# Patient Record
Sex: MALE | Race: WHITE | NOT HISPANIC OR LATINO | Employment: OTHER | URBAN - METROPOLITAN AREA
[De-identification: names, ages, dates, MRNs, and addresses within clinical notes are randomized per-mention and may not be internally consistent; named-entity substitution may affect disease eponyms.]

---

## 2020-06-03 ENCOUNTER — HOSPITAL ENCOUNTER (OUTPATIENT)
Facility: MEDICAL CENTER | Age: 75
DRG: 281 | End: 2020-06-03
Payer: MEDICARE

## 2020-06-03 ENCOUNTER — HOSPITAL ENCOUNTER (INPATIENT)
Facility: MEDICAL CENTER | Age: 75
LOS: 2 days | DRG: 281 | End: 2020-06-05
Attending: INTERNAL MEDICINE | Admitting: INTERNAL MEDICINE
Payer: MEDICARE

## 2020-06-03 PROBLEM — I21.4 NSTEMI (NON-ST ELEVATED MYOCARDIAL INFARCTION) (HCC): Status: ACTIVE | Noted: 2020-06-03

## 2020-06-03 PROBLEM — N40.0 BPH (BENIGN PROSTATIC HYPERPLASIA): Status: ACTIVE | Noted: 2020-06-03

## 2020-06-03 PROBLEM — D72.829 LEUKOCYTOSIS: Status: ACTIVE | Noted: 2020-06-03

## 2020-06-03 PROBLEM — I10 HTN (HYPERTENSION): Status: ACTIVE | Noted: 2020-06-03

## 2020-06-03 LAB
ALBUMIN SERPL BCP-MCNC: 3.6 G/DL (ref 3.2–4.9)
ALBUMIN/GLOB SERPL: 1.4 G/DL
ALP SERPL-CCNC: 52 U/L (ref 30–99)
ALT SERPL-CCNC: 19 U/L (ref 2–50)
ANION GAP SERPL CALC-SCNC: 12 MMOL/L (ref 7–16)
AST SERPL-CCNC: 38 U/L (ref 12–45)
BASOPHILS # BLD AUTO: 0.2 % (ref 0–1.8)
BASOPHILS # BLD: 0.03 K/UL (ref 0–0.12)
BILIRUB SERPL-MCNC: 0.6 MG/DL (ref 0.1–1.5)
BUN SERPL-MCNC: 28 MG/DL (ref 8–22)
CALCIUM SERPL-MCNC: 9.3 MG/DL (ref 8.5–10.5)
CHLORIDE SERPL-SCNC: 102 MMOL/L (ref 96–112)
CO2 SERPL-SCNC: 21 MMOL/L (ref 20–33)
CREAT SERPL-MCNC: 0.86 MG/DL (ref 0.5–1.4)
EKG IMPRESSION: NORMAL
EOSINOPHIL # BLD AUTO: 0 K/UL (ref 0–0.51)
EOSINOPHIL NFR BLD: 0 % (ref 0–6.9)
ERYTHROCYTE [DISTWIDTH] IN BLOOD BY AUTOMATED COUNT: 42.7 FL (ref 35.9–50)
GLOBULIN SER CALC-MCNC: 2.5 G/DL (ref 1.9–3.5)
GLUCOSE SERPL-MCNC: 116 MG/DL (ref 65–99)
HCT VFR BLD AUTO: 41.7 % (ref 42–52)
HGB BLD-MCNC: 14.6 G/DL (ref 14–18)
IMM GRANULOCYTES # BLD AUTO: 0.07 K/UL (ref 0–0.11)
IMM GRANULOCYTES NFR BLD AUTO: 0.5 % (ref 0–0.9)
LYMPHOCYTES # BLD AUTO: 0.84 K/UL (ref 1–4.8)
LYMPHOCYTES NFR BLD: 5.4 % (ref 22–41)
MCH RBC QN AUTO: 31.1 PG (ref 27–33)
MCHC RBC AUTO-ENTMCNC: 35 G/DL (ref 33.7–35.3)
MCV RBC AUTO: 88.9 FL (ref 81.4–97.8)
MONOCYTES # BLD AUTO: 0.99 K/UL (ref 0–0.85)
MONOCYTES NFR BLD AUTO: 6.4 % (ref 0–13.4)
NEUTROPHILS # BLD AUTO: 13.61 K/UL (ref 1.82–7.42)
NEUTROPHILS NFR BLD: 87.5 % (ref 44–72)
NRBC # BLD AUTO: 0 K/UL
NRBC BLD-RTO: 0 /100 WBC
PLATELET # BLD AUTO: 209 K/UL (ref 164–446)
PMV BLD AUTO: 10 FL (ref 9–12.9)
POTASSIUM SERPL-SCNC: 3.8 MMOL/L (ref 3.6–5.5)
PROT SERPL-MCNC: 6.1 G/DL (ref 6–8.2)
RBC # BLD AUTO: 4.69 M/UL (ref 4.7–6.1)
SODIUM SERPL-SCNC: 135 MMOL/L (ref 135–145)
TROPONIN T SERPL-MCNC: 310 NG/L (ref 6–19)
WBC # BLD AUTO: 15.5 K/UL (ref 4.8–10.8)

## 2020-06-03 PROCEDURE — 80053 COMPREHEN METABOLIC PANEL: CPT

## 2020-06-03 PROCEDURE — 93005 ELECTROCARDIOGRAM TRACING: CPT | Performed by: INTERNAL MEDICINE

## 2020-06-03 PROCEDURE — 85025 COMPLETE CBC W/AUTO DIFF WBC: CPT

## 2020-06-03 PROCEDURE — A9270 NON-COVERED ITEM OR SERVICE: HCPCS | Performed by: HOSPITALIST

## 2020-06-03 PROCEDURE — 84484 ASSAY OF TROPONIN QUANT: CPT

## 2020-06-03 PROCEDURE — 700111 HCHG RX REV CODE 636 W/ 250 OVERRIDE (IP): Performed by: HOSPITALIST

## 2020-06-03 PROCEDURE — 99223 1ST HOSP IP/OBS HIGH 75: CPT | Mod: AI | Performed by: HOSPITALIST

## 2020-06-03 PROCEDURE — 93010 ELECTROCARDIOGRAM REPORT: CPT | Performed by: INTERNAL MEDICINE

## 2020-06-03 PROCEDURE — 93005 ELECTROCARDIOGRAM TRACING: CPT | Performed by: HOSPITALIST

## 2020-06-03 PROCEDURE — 36415 COLL VENOUS BLD VENIPUNCTURE: CPT

## 2020-06-03 PROCEDURE — 700102 HCHG RX REV CODE 250 W/ 637 OVERRIDE(OP): Performed by: HOSPITALIST

## 2020-06-03 PROCEDURE — 770020 HCHG ROOM/CARE - TELE (206)

## 2020-06-03 RX ORDER — HEPARIN SODIUM 5000 [USP'U]/100ML
INJECTION, SOLUTION INTRAVENOUS CONTINUOUS
Status: DISCONTINUED | OUTPATIENT
Start: 2020-06-03 | End: 2020-06-04

## 2020-06-03 RX ORDER — ONDANSETRON 4 MG/1
4 TABLET, ORALLY DISINTEGRATING ORAL EVERY 4 HOURS PRN
Status: DISCONTINUED | OUTPATIENT
Start: 2020-06-03 | End: 2020-06-03

## 2020-06-03 RX ORDER — HEPARIN SODIUM 1000 [USP'U]/ML
3200 INJECTION, SOLUTION INTRAVENOUS; SUBCUTANEOUS PRN
Status: DISCONTINUED | OUTPATIENT
Start: 2020-06-03 | End: 2020-06-04

## 2020-06-03 RX ORDER — ASPIRIN 300 MG/1
300 SUPPOSITORY RECTAL DAILY
Status: DISCONTINUED | OUTPATIENT
Start: 2020-06-04 | End: 2020-06-04

## 2020-06-03 RX ORDER — ONDANSETRON 2 MG/ML
4 INJECTION INTRAMUSCULAR; INTRAVENOUS EVERY 4 HOURS PRN
Status: DISCONTINUED | OUTPATIENT
Start: 2020-06-03 | End: 2020-06-03

## 2020-06-03 RX ORDER — GUAIFENESIN 600 MG/1
600 TABLET, EXTENDED RELEASE ORAL 2 TIMES DAILY
COMMUNITY

## 2020-06-03 RX ORDER — POLYETHYLENE GLYCOL 3350 17 G/17G
1 POWDER, FOR SOLUTION ORAL
Status: DISCONTINUED | OUTPATIENT
Start: 2020-06-03 | End: 2020-06-05 | Stop reason: HOSPADM

## 2020-06-03 RX ORDER — ASPIRIN 81 MG/1
324 TABLET, CHEWABLE ORAL DAILY
Status: DISCONTINUED | OUTPATIENT
Start: 2020-06-04 | End: 2020-06-04

## 2020-06-03 RX ORDER — BISACODYL 10 MG
10 SUPPOSITORY, RECTAL RECTAL
Status: DISCONTINUED | OUTPATIENT
Start: 2020-06-03 | End: 2020-06-05 | Stop reason: HOSPADM

## 2020-06-03 RX ORDER — HEPARIN SODIUM 1000 [USP'U]/ML
6000 INJECTION, SOLUTION INTRAVENOUS; SUBCUTANEOUS ONCE
Status: COMPLETED | OUTPATIENT
Start: 2020-06-03 | End: 2020-06-03

## 2020-06-03 RX ORDER — DIPHENHYDRAMINE HCL 25 MG
25 TABLET ORAL ONCE
Status: COMPLETED | OUTPATIENT
Start: 2020-06-03 | End: 2020-06-03

## 2020-06-03 RX ORDER — MONTELUKAST SODIUM 10 MG/1
10 TABLET ORAL DAILY
COMMUNITY

## 2020-06-03 RX ORDER — ACETAMINOPHEN 500 MG
1000 TABLET ORAL EVERY 6 HOURS PRN
COMMUNITY

## 2020-06-03 RX ORDER — AMOXICILLIN 250 MG
2 CAPSULE ORAL 2 TIMES DAILY
Status: DISCONTINUED | OUTPATIENT
Start: 2020-06-03 | End: 2020-06-05 | Stop reason: HOSPADM

## 2020-06-03 RX ORDER — CARVEDILOL 6.25 MG/1
6.25 TABLET ORAL 2 TIMES DAILY WITH MEALS
COMMUNITY

## 2020-06-03 RX ORDER — ASPIRIN 325 MG
325 TABLET ORAL DAILY
Status: DISCONTINUED | OUTPATIENT
Start: 2020-06-04 | End: 2020-06-04

## 2020-06-03 RX ORDER — DIPHENHYDRAMINE HCL 50 MG
50 CAPSULE ORAL EVERY 4 HOURS PRN
COMMUNITY

## 2020-06-03 RX ORDER — ALPRAZOLAM 0.25 MG/1
0.25 TABLET ORAL ONCE
Status: COMPLETED | OUTPATIENT
Start: 2020-06-03 | End: 2020-06-04

## 2020-06-03 RX ORDER — ACETAMINOPHEN 325 MG/1
650 TABLET ORAL EVERY 6 HOURS PRN
Status: DISCONTINUED | OUTPATIENT
Start: 2020-06-03 | End: 2020-06-05 | Stop reason: HOSPADM

## 2020-06-03 RX ORDER — TAMSULOSIN HYDROCHLORIDE 0.4 MG/1
0.4 CAPSULE ORAL
COMMUNITY

## 2020-06-03 RX ADMIN — HEPARIN SODIUM 6000 UNITS: 1000 INJECTION, SOLUTION INTRAVENOUS; SUBCUTANEOUS at 21:34

## 2020-06-03 RX ADMIN — DIPHENHYDRAMINE HYDROCHLORIDE 25 MG: 25 TABLET ORAL at 21:28

## 2020-06-03 RX ADMIN — HEPARIN SODIUM 1200 UNITS/HR: 5000 INJECTION, SOLUTION INTRAVENOUS at 21:34

## 2020-06-03 ASSESSMENT — ENCOUNTER SYMPTOMS
HEADACHES: 0
WEAKNESS: 0
DIZZINESS: 0
SORE THROAT: 0
NAUSEA: 0
SHORTNESS OF BREATH: 0
COUGH: 0
DOUBLE VISION: 0
DEPRESSION: 0
BLURRED VISION: 0
INSOMNIA: 0
PALPITATIONS: 0
VOMITING: 0
FEVER: 0
MYALGIAS: 0
NECK PAIN: 0
BRUISES/BLEEDS EASILY: 0

## 2020-06-03 ASSESSMENT — COGNITIVE AND FUNCTIONAL STATUS - GENERAL
SUGGESTED CMS G CODE MODIFIER DAILY ACTIVITY: CH
DAILY ACTIVITIY SCORE: 24
MOBILITY SCORE: 24
SUGGESTED CMS G CODE MODIFIER MOBILITY: CH

## 2020-06-03 ASSESSMENT — PATIENT HEALTH QUESTIONNAIRE - PHQ9
SUM OF ALL RESPONSES TO PHQ9 QUESTIONS 1 AND 2: 0
2. FEELING DOWN, DEPRESSED, IRRITABLE, OR HOPELESS: NOT AT ALL
1. LITTLE INTEREST OR PLEASURE IN DOING THINGS: NOT AT ALL

## 2020-06-03 ASSESSMENT — LIFESTYLE VARIABLES
ALCOHOL_USE: NO
HAVE YOU EVER FELT YOU SHOULD CUT DOWN ON YOUR DRINKING: NO
AVERAGE NUMBER OF DAYS PER WEEK YOU HAVE A DRINK CONTAINING ALCOHOL: 0
EVER HAD A DRINK FIRST THING IN THE MORNING TO STEADY YOUR NERVES TO GET RID OF A HANGOVER: NO
TOTAL SCORE: 0
EVER_SMOKED: YES
HAVE PEOPLE ANNOYED YOU BY CRITICIZING YOUR DRINKING: NO
EVER FELT BAD OR GUILTY ABOUT YOUR DRINKING: NO
HOW MANY TIMES IN THE PAST YEAR HAVE YOU HAD 5 OR MORE DRINKS IN A DAY: 0
ON A TYPICAL DAY WHEN YOU DRINK ALCOHOL HOW MANY DRINKS DO YOU HAVE: 0
TOTAL SCORE: 0
TOTAL SCORE: 0
CONSUMPTION TOTAL: NEGATIVE
DOES PATIENT WANT TO STOP DRINKING: NO

## 2020-06-03 NOTE — PROGRESS NOTES
Triage officer note /transfer note       D/W Dr Marquez     CC: vomitting and weakness, no CP, NSTEMI. Hx of AICD and HTN. WBC 23k, but culture pending, no fever. Cr 1.5.     From [facility], trop 3.125->3.418. Dr. Ceja agreed to see patient here.      Need to do: call cards while patient arrived     Admit to Inpatient telemetry admission with cardiac monitoring.

## 2020-06-04 ENCOUNTER — APPOINTMENT (OUTPATIENT)
Dept: CARDIOLOGY | Facility: MEDICAL CENTER | Age: 75
DRG: 281 | End: 2020-06-04
Attending: INTERNAL MEDICINE
Payer: MEDICARE

## 2020-06-04 ENCOUNTER — APPOINTMENT (OUTPATIENT)
Dept: CARDIOLOGY | Facility: MEDICAL CENTER | Age: 75
DRG: 281 | End: 2020-06-04
Attending: HOSPITALIST
Payer: MEDICARE

## 2020-06-04 PROBLEM — N30.00 ACUTE CYSTITIS WITHOUT HEMATURIA: Status: ACTIVE | Noted: 2020-06-03

## 2020-06-04 LAB
ANION GAP SERPL CALC-SCNC: 7 MMOL/L (ref 7–16)
ANISOCYTOSIS BLD QL SMEAR: ABNORMAL
APPEARANCE UR: ABNORMAL
APTT PPP: 138 SEC (ref 24.7–36)
APTT PPP: 99.4 SEC (ref 24.7–36)
BACTERIA #/AREA URNS HPF: ABNORMAL /HPF
BASOPHILS # BLD AUTO: 0.9 % (ref 0–1.8)
BASOPHILS # BLD: 0.11 K/UL (ref 0–0.12)
BILIRUB UR QL STRIP.AUTO: NEGATIVE
BUN SERPL-MCNC: 29 MG/DL (ref 8–22)
BURR CELLS BLD QL SMEAR: NORMAL
CALCIUM SERPL-MCNC: 9.3 MG/DL (ref 8.5–10.5)
CHLORIDE SERPL-SCNC: 104 MMOL/L (ref 96–112)
CO2 SERPL-SCNC: 26 MMOL/L (ref 20–33)
COLOR UR: YELLOW
CREAT SERPL-MCNC: 0.96 MG/DL (ref 0.5–1.4)
EKG IMPRESSION: NORMAL
EOSINOPHIL # BLD AUTO: 0 K/UL (ref 0–0.51)
EOSINOPHIL NFR BLD: 0 % (ref 0–6.9)
EPI CELLS #/AREA URNS HPF: ABNORMAL /HPF
ERYTHROCYTE [DISTWIDTH] IN BLOOD BY AUTOMATED COUNT: 43.7 FL (ref 35.9–50)
GLUCOSE SERPL-MCNC: 90 MG/DL (ref 65–99)
GLUCOSE UR STRIP.AUTO-MCNC: NEGATIVE MG/DL
HCT VFR BLD AUTO: 42.6 % (ref 42–52)
HGB BLD-MCNC: 14.6 G/DL (ref 14–18)
KETONES UR STRIP.AUTO-MCNC: ABNORMAL MG/DL
LEUKOCYTE ESTERASE UR QL STRIP.AUTO: ABNORMAL
LV EJECT FRACT  99904: 50
LV EJECT FRACT MOD 4C 99902: 58.37
LYMPHOCYTES # BLD AUTO: 0.11 K/UL (ref 1–4.8)
LYMPHOCYTES NFR BLD: 0.9 % (ref 22–41)
MANUAL DIFF BLD: NORMAL
MCH RBC QN AUTO: 31.1 PG (ref 27–33)
MCHC RBC AUTO-ENTMCNC: 34.3 G/DL (ref 33.7–35.3)
MCV RBC AUTO: 90.8 FL (ref 81.4–97.8)
MICRO URNS: ABNORMAL
MICROCYTES BLD QL SMEAR: ABNORMAL
MONOCYTES # BLD AUTO: 0.21 K/UL (ref 0–0.85)
MONOCYTES NFR BLD AUTO: 1.7 % (ref 0–13.4)
MORPHOLOGY BLD-IMP: NORMAL
NEUTROPHILS # BLD AUTO: 11.87 K/UL (ref 1.82–7.42)
NEUTROPHILS NFR BLD: 88.7 % (ref 44–72)
NEUTS BAND NFR BLD MANUAL: 7.8 % (ref 0–10)
NITRITE UR QL STRIP.AUTO: POSITIVE
NRBC # BLD AUTO: 0 K/UL
NRBC BLD-RTO: 0 /100 WBC
PH UR STRIP.AUTO: 7 [PH] (ref 5–8)
PLATELET # BLD AUTO: 193 K/UL (ref 164–446)
PLATELET BLD QL SMEAR: NORMAL
PMV BLD AUTO: 10.3 FL (ref 9–12.9)
POIKILOCYTOSIS BLD QL SMEAR: NORMAL
POTASSIUM SERPL-SCNC: 4 MMOL/L (ref 3.6–5.5)
PROT UR QL STRIP: 30 MG/DL
RBC # BLD AUTO: 4.69 M/UL (ref 4.7–6.1)
RBC # URNS HPF: ABNORMAL /HPF
RBC BLD AUTO: PRESENT
RBC UR QL AUTO: ABNORMAL
RENAL EPI CELLS #/AREA URNS HPF: NEGATIVE /HPF
SODIUM SERPL-SCNC: 137 MMOL/L (ref 135–145)
SP GR UR STRIP.AUTO: 1.02
TRANS CELLS #/AREA URNS HPF: NEGATIVE /HPF
TROPONIN T SERPL-MCNC: 269 NG/L (ref 6–19)
UROBILINOGEN UR STRIP.AUTO-MCNC: 2 MG/DL
WBC # BLD AUTO: 12.3 K/UL (ref 4.8–10.8)
WBC #/AREA URNS HPF: ABNORMAL /HPF

## 2020-06-04 PROCEDURE — 81001 URINALYSIS AUTO W/SCOPE: CPT

## 2020-06-04 PROCEDURE — 85730 THROMBOPLASTIN TIME PARTIAL: CPT

## 2020-06-04 PROCEDURE — 700102 HCHG RX REV CODE 250 W/ 637 OVERRIDE(OP): Performed by: INTERNAL MEDICINE

## 2020-06-04 PROCEDURE — 80048 BASIC METABOLIC PNL TOTAL CA: CPT

## 2020-06-04 PROCEDURE — A9270 NON-COVERED ITEM OR SERVICE: HCPCS | Performed by: NURSE PRACTITIONER

## 2020-06-04 PROCEDURE — B2111ZZ FLUOROSCOPY OF MULTIPLE CORONARY ARTERIES USING LOW OSMOLAR CONTRAST: ICD-10-PCS | Performed by: INTERNAL MEDICINE

## 2020-06-04 PROCEDURE — 700102 HCHG RX REV CODE 250 W/ 637 OVERRIDE(OP): Performed by: NURSE PRACTITIONER

## 2020-06-04 PROCEDURE — 93306 TTE W/DOPPLER COMPLETE: CPT | Mod: 26 | Performed by: INTERNAL MEDICINE

## 2020-06-04 PROCEDURE — B2151ZZ FLUOROSCOPY OF LEFT HEART USING LOW OSMOLAR CONTRAST: ICD-10-PCS | Performed by: INTERNAL MEDICINE

## 2020-06-04 PROCEDURE — 700101 HCHG RX REV CODE 250

## 2020-06-04 PROCEDURE — 700105 HCHG RX REV CODE 258: Performed by: INTERNAL MEDICINE

## 2020-06-04 PROCEDURE — 85027 COMPLETE CBC AUTOMATED: CPT

## 2020-06-04 PROCEDURE — 84484 ASSAY OF TROPONIN QUANT: CPT

## 2020-06-04 PROCEDURE — 94760 N-INVAS EAR/PLS OXIMETRY 1: CPT

## 2020-06-04 PROCEDURE — 700102 HCHG RX REV CODE 250 W/ 637 OVERRIDE(OP): Performed by: HOSPITALIST

## 2020-06-04 PROCEDURE — 770020 HCHG ROOM/CARE - TELE (206)

## 2020-06-04 PROCEDURE — 87086 URINE CULTURE/COLONY COUNT: CPT

## 2020-06-04 PROCEDURE — 85007 BL SMEAR W/DIFF WBC COUNT: CPT

## 2020-06-04 PROCEDURE — 99233 SBSQ HOSP IP/OBS HIGH 50: CPT | Performed by: INTERNAL MEDICINE

## 2020-06-04 PROCEDURE — 93458 L HRT ARTERY/VENTRICLE ANGIO: CPT | Mod: 26 | Performed by: INTERNAL MEDICINE

## 2020-06-04 PROCEDURE — 700111 HCHG RX REV CODE 636 W/ 250 OVERRIDE (IP): Performed by: INTERNAL MEDICINE

## 2020-06-04 PROCEDURE — A9270 NON-COVERED ITEM OR SERVICE: HCPCS | Performed by: INTERNAL MEDICINE

## 2020-06-04 PROCEDURE — 99153 MOD SED SAME PHYS/QHP EA: CPT

## 2020-06-04 PROCEDURE — 4A023N7 MEASUREMENT OF CARDIAC SAMPLING AND PRESSURE, LEFT HEART, PERCUTANEOUS APPROACH: ICD-10-PCS | Performed by: INTERNAL MEDICINE

## 2020-06-04 PROCEDURE — 700111 HCHG RX REV CODE 636 W/ 250 OVERRIDE (IP)

## 2020-06-04 PROCEDURE — 99152 MOD SED SAME PHYS/QHP 5/>YRS: CPT | Performed by: INTERNAL MEDICINE

## 2020-06-04 PROCEDURE — A9270 NON-COVERED ITEM OR SERVICE: HCPCS | Performed by: HOSPITALIST

## 2020-06-04 PROCEDURE — 700117 HCHG RX CONTRAST REV CODE 255: Performed by: INTERNAL MEDICINE

## 2020-06-04 PROCEDURE — 36415 COLL VENOUS BLD VENIPUNCTURE: CPT

## 2020-06-04 PROCEDURE — 93306 TTE W/DOPPLER COMPLETE: CPT

## 2020-06-04 RX ORDER — LIDOCAINE HYDROCHLORIDE 20 MG/ML
INJECTION, SOLUTION INFILTRATION; PERINEURAL
Status: COMPLETED
Start: 2020-06-04 | End: 2020-06-04

## 2020-06-04 RX ORDER — HEPARIN SODIUM,PORCINE 1000/ML
VIAL (ML) INJECTION
Status: COMPLETED
Start: 2020-06-04 | End: 2020-06-04

## 2020-06-04 RX ORDER — DIPHENHYDRAMINE HCL 25 MG
50 TABLET ORAL EVERY 4 HOURS PRN
Status: DISCONTINUED | OUTPATIENT
Start: 2020-06-04 | End: 2020-06-05 | Stop reason: HOSPADM

## 2020-06-04 RX ORDER — MIDAZOLAM HYDROCHLORIDE 1 MG/ML
INJECTION INTRAMUSCULAR; INTRAVENOUS
Status: COMPLETED
Start: 2020-06-04 | End: 2020-06-04

## 2020-06-04 RX ORDER — VERAPAMIL HYDROCHLORIDE 2.5 MG/ML
INJECTION, SOLUTION INTRAVENOUS
Status: COMPLETED
Start: 2020-06-04 | End: 2020-06-04

## 2020-06-04 RX ORDER — HEPARIN SODIUM 200 [USP'U]/100ML
INJECTION, SOLUTION INTRAVENOUS
Status: COMPLETED
Start: 2020-06-04 | End: 2020-06-04

## 2020-06-04 RX ORDER — GUAIFENESIN 600 MG/1
600 TABLET, EXTENDED RELEASE ORAL 2 TIMES DAILY
Status: DISCONTINUED | OUTPATIENT
Start: 2020-06-04 | End: 2020-06-05 | Stop reason: HOSPADM

## 2020-06-04 RX ORDER — CARVEDILOL 6.25 MG/1
6.25 TABLET ORAL 2 TIMES DAILY WITH MEALS
Status: DISCONTINUED | OUTPATIENT
Start: 2020-06-04 | End: 2020-06-05 | Stop reason: HOSPADM

## 2020-06-04 RX ORDER — MONTELUKAST SODIUM 10 MG/1
10 TABLET ORAL DAILY
Status: DISCONTINUED | OUTPATIENT
Start: 2020-06-04 | End: 2020-06-05 | Stop reason: HOSPADM

## 2020-06-04 RX ORDER — TAMSULOSIN HYDROCHLORIDE 0.4 MG/1
0.4 CAPSULE ORAL
Status: DISCONTINUED | OUTPATIENT
Start: 2020-06-05 | End: 2020-06-05 | Stop reason: HOSPADM

## 2020-06-04 RX ADMIN — MIDAZOLAM HYDROCHLORIDE 1 MG: 1 INJECTION, SOLUTION INTRAMUSCULAR; INTRAVENOUS at 08:46

## 2020-06-04 RX ADMIN — FENTANYL CITRATE 25 MCG: 50 INJECTION, SOLUTION INTRAMUSCULAR; INTRAVENOUS at 08:46

## 2020-06-04 RX ADMIN — ALPRAZOLAM 0.25 MG: 0.25 TABLET ORAL at 05:47

## 2020-06-04 RX ADMIN — VERAPAMIL HYDROCHLORIDE 5 MG: 5 INJECTION INTRAVENOUS at 08:36

## 2020-06-04 RX ADMIN — HEPARIN SODIUM: 1000 INJECTION, SOLUTION INTRAVENOUS; SUBCUTANEOUS at 08:38

## 2020-06-04 RX ADMIN — ASPIRIN 325 MG: 325 TABLET, FILM COATED ORAL at 05:47

## 2020-06-04 RX ADMIN — IOHEXOL 80 ML: 350 INJECTION, SOLUTION INTRAVENOUS at 08:47

## 2020-06-04 RX ADMIN — NITROGLYCERIN 10 ML: 20 INJECTION INTRAVENOUS at 08:36

## 2020-06-04 RX ADMIN — CARVEDILOL 6.25 MG: 6.25 TABLET, FILM COATED ORAL at 17:37

## 2020-06-04 RX ADMIN — DIPHENHYDRAMINE HYDROCHLORIDE 50 MG: 25 TABLET ORAL at 20:31

## 2020-06-04 RX ADMIN — CEFTRIAXONE SODIUM 2 G: 2 INJECTION, POWDER, FOR SOLUTION INTRAMUSCULAR; INTRAVENOUS at 09:37

## 2020-06-04 RX ADMIN — LIDOCAINE HYDROCHLORIDE: 20 INJECTION, SOLUTION INFILTRATION; PERINEURAL at 08:35

## 2020-06-04 RX ADMIN — MONTELUKAST SODIUM 10 MG: 10 TABLET, COATED ORAL at 13:23

## 2020-06-04 RX ADMIN — GUAIFENESIN 600 MG: 600 TABLET, EXTENDED RELEASE ORAL at 17:37

## 2020-06-04 RX ADMIN — DIPHENHYDRAMINE HYDROCHLORIDE 50 MG: 25 TABLET ORAL at 13:23

## 2020-06-04 RX ADMIN — HEPARIN SODIUM 2000 UNITS: 200 INJECTION, SOLUTION INTRAVENOUS at 08:37

## 2020-06-04 RX ADMIN — DOCUSATE SODIUM 50 MG AND SENNOSIDES 8.6 MG 2 TABLET: 8.6; 5 TABLET, FILM COATED ORAL at 17:36

## 2020-06-04 ASSESSMENT — ENCOUNTER SYMPTOMS
HEARTBURN: 0
CHOKING: 0
CHILLS: 0
SORE THROAT: 0
FEVER: 0
COUGH: 0
FOCAL WEAKNESS: 0
WHEEZING: 0
CONSTIPATION: 0
DEPRESSION: 0
MYALGIAS: 0
BLURRED VISION: 0
SHORTNESS OF BREATH: 0
APNEA: 0
DIARRHEA: 0
WEAKNESS: 0
CHEST TIGHTNESS: 0
PALPITATIONS: 0
ABDOMINAL PAIN: 0
DIZZINESS: 0
BLOOD IN STOOL: 0
HEADACHES: 0
STRIDOR: 0
NAUSEA: 0
VOMITING: 0

## 2020-06-04 ASSESSMENT — FIBROSIS 4 INDEX: FIB4 SCORE: 3.34

## 2020-06-04 NOTE — ASSESSMENT & PLAN NOTE
-Likely reactive, improved from 23 prior transfer to 16.   -There is no clear indications of infection at this time.  -He did received IV fluids prior to transfer.  -Monitor morning labs.

## 2020-06-04 NOTE — RESPIRATORY CARE
Oxygen Rounds      Patient found on    O2 L/m:  2  Oxygen device:  Silicone nasal cannula  Spo2: _______96__%        Respiratory device skin site inspection completed.

## 2020-06-04 NOTE — CARE PLAN
Problem: Communication  Goal: The ability to communicate needs accurately and effectively will improve  Outcome: PROGRESSING AS EXPECTED  Note: Patient educated on medications, procedures and use of call light. Patient will continue to verbalize and improve on education and communication in the plan of care.        Problem: Safety  Goal: Will remain free from injury  Outcome: PROGRESSING AS EXPECTED  Note: Educated patient on use of call light, no slip socks on, bed lowest position. All needs attended to. Patient verbalized understanding.

## 2020-06-04 NOTE — PROGRESS NOTES
Patient back from cath lab, AOx4, VSS, post op VS initiated, R radial site clean/dry/soft with hemoband in place. Tele monitor on and monitor room notified. Heparin gtt DCd in cath lab.

## 2020-06-04 NOTE — PROGRESS NOTES
aptt >240 @0000  heparin gtt stopped for 1 hour @0030 per MD Chavez  (aptt retaken just in case while heparin gtt stopped and resulted as 99.4 @0101)  heparin gtt was restarted @0200  per pharmacist continue protocol on maintemace phase including rate change now.

## 2020-06-04 NOTE — ASSESSMENT & PLAN NOTE
-Direct Admission from [facility] [place].   -Troponin prior to transfer: 3.125 and then 3.418.  -Patient was seen and evaluated by cardiologist here.  -Continue heparin  -Patient will be taken to the Cath Lab in the morning and echocardiogram that was ordered  -Cardiologist requested prior medical records  -Appreciate cardiology consult and recommendations: I personally discussed this case with .  -Added laboratories including serial cardiac enzymes overnight.  -He is chest pain-free at this time.  -Patient does have a pacemaker will be interrogated.

## 2020-06-04 NOTE — PROGRESS NOTES
2 RN Skin Check    2 RN skin check completed with Kenia NEELY.   Devices in place: Nasal Cannula.  Skin assessed under devices: yes.  Confirmed pressure ulcers found on: NA.  New potential pressure ulcers noted on NA. Wound consult placed No.  The following interventions in place Pillows and Lotion.  Skin is CDI

## 2020-06-04 NOTE — PROGRESS NOTES
Transfer Center:    Received ED to Direct Admit transfer request from [facility] @ 1612pm   Sending Physician:  Jimmy   Specialist consulted:  fawad   Diagnosis:  NSTEMI   Patient accepted by:  Kip     Patient coming via:  air  ETA:  TBD   Medical records from sending facility scanned into Media tab.  Nursing to notify Direct Admit On-Call hospitalist and Specialist when patient arrives.     Plan:  Transfer Center to assist if needed.

## 2020-06-04 NOTE — PROCEDURES
DATE OF SERVICE:  06/04/2020    REFERRING PHYSICIANS:  Deyvi Mendoza MD, and Farheen Washington MD    PROCEDURES:  1.  Left heart catheterization.  2.  Coronary angiography.  3.  Left ventriculogram.  4.  Monitor conscious sedation.    PREPROCEDURE DIAGNOSES:  1.  Non-ST elevation myocardial infarction.  2.  Dilated cardiomyopathy with ejection fraction of 25%.    POSTPROCEDURE DIAGNOSES:  1.  Nonobstructive coronary artery disease.  2.  Reduced left ventricular systolic function with ejection fraction of 30%.  3.  Elevated left ventricular end-diastolic pressure.    INDICATION:  The patient is a 75-year-old male with dilated cardiomyopathy   with ejection fraction of 25%, CRT/ICD in place.  Laboratory examination   showed elevated troponin level.  He was scheduled for cardiac catheterization.    PROCEDURE:  After informed consent was signed by the patient, patient was   brought to the cardiac catheterization laboratory.  He was prepped and draped   in the usual sterile manner.  The right wrist area was anesthetized with 2%   Xylocaine.  A 6-Algerian sheath was inserted into the right radial artery using   modified Seldinger technique.  Intra-arterial verapamil and nitroglycerin were  given.  IV heparin was given.  A 4-Algerian JL-3.5 catheter was positioned into  the left main coronary artery.  Coronary angiography was performed.  This was  exchanged for a 4-Algerian JR-4 catheter, which was positioned into the right   coronary artery.  Coronary angiography was performed.  This catheter was   used to cross the aortic valve with a straight wire and exchanged over an   exchangedlength wire for a 4-Algerian pigtail catheter, which was positioned   into the left ventricle.  Left angiography was performed.  Patient tolerated the   procedure well.  At the end of procedure, all catheters and sheaths were   removed.  Hemoband was placed in the right wrist.  He was transferred back   to telemetry in stable  condition.    HEMODYNAMIC DATA:  Hemodynamic data shows aortic pressures of 120/80 mmHg  with mean of 100 mmHg and the /0 with LVEDP of 20 mmHg.    AORTIC VALVE:  There was 20 mm peak-to-peak gradient noted.    LEFT VENTRICULOGRAM:  A 10 mL of contrast was delivered for 3 seconds.    Ejection fraction was estimated to be 30%.  There was global hypokinesis   noted.    ANGIOGRAM:  1.  Left main coronary artery:  Left main coronary artery is a long   large-caliber vessel free of disease.  2.  Left anterior descending artery:  Left anterior descending artery is a   long moderate-caliber, which wraps around the apex.  Proximal portion of the   vessel, there are concentric 30-40% stenoses.  There are moderate-caliber   diagonal branches with proximal luminal irregularities of 20-30%.  3.  Ramus intermedius:  Ramus intermedius is a long large bifurcating vessel   with proximal concentric 20-30% stenosis.  4.  Left circumflex artery:  Left circumflex artery is a nondominant   moderate-caliber vessel with small-caliber first obtuse marginal branch and   moderate-caliber bifurcating second obtuse marginal branch.  Left circumflex   artery and its branches are free of disease.  5.  Right coronary artery:  Right coronary artery is a dominant large-caliber   vessel with diffuse stenosis throughout.  Proximal portion of the vessel,   there is a concentric 30-40% stenosis.  Mid portion of the vessel, there are   diffuse stenoses of 40% with focal concentric 60% prior to aneurysm.    Distally, there are long trifurcating posterior descending artery and   posterolateral branches noted free of disease.    IMPRESSION:  1.  Nonobstructive coronary artery disease.  2.  Reduced left ventricular systolic function with ejection fraction of 30%.  3.  Elevated left ventricular end-diastolic pressure.    RECOMMENDATIONS:  Recommend to continue with medical therapy.    SEDATION TIME: The patient's sedation was managed by myself with  continuous   face to face time with the patient for 15 minutes from 08:10 to 08:25.         ____________________________________     MD LORENA WADE / NADIA    DD:  06/04/2020 09:10:45  DT:  06/04/2020 09:57:11    D#:  [id number]  Job#:  [id number]

## 2020-06-04 NOTE — PROGRESS NOTES
HOSPITAL MEDICINE PROGRESS NOTE    Date of service  6/4/2020    Chief Complaint  Chest pain    Hospital Course:     75-year-old man who has a history of coronary artery disease presented with chest pain from OSH.           Interval History Updates:  No event overnight.  Afebrile.    Patient is scheduled for cardiac angiogram today.  He reports dysuria PTA but has resolved this afternoon.    Consultants/Specialty  Cardiology    Review of Systems   Review of Systems   Constitutional: Negative for chills and fever.   HENT: Negative for ear pain and sore throat.    Eyes: Negative for blurred vision.   Respiratory: Negative for shortness of breath.    Cardiovascular: Negative for chest pain, palpitations and leg swelling.   Gastrointestinal: Negative for abdominal pain, blood in stool, constipation, diarrhea, heartburn, melena, nausea and vomiting.   Genitourinary: Negative for dysuria, hematuria and urgency.   Musculoskeletal: Negative for myalgias.   Skin: Negative for rash.   Neurological: Negative for dizziness, focal weakness, weakness and headaches.   Endo/Heme/Allergies: Negative.    Psychiatric/Behavioral: Negative for depression.   All other systems reviewed and are negative.       Medications:  • cefTRIAXone (ROCEPHIN) IV  2 g Q24HRS   • montelukast  10 mg DAILY   • guaiFENesin ER  600 mg BID   • [START ON 6/5/2020] tamsulosin  0.4 mg AFTER BREAKFAST   • diphenhydrAMINE  50 mg Q4HRS PRN   • senna-docusate  2 Tab BID    And   • polyethylene glycol/lytes  1 Packet QDAY PRN    And   • magnesium hydroxide  30 mL QDAY PRN    And   • bisacodyl  10 mg QDAY PRN   • acetaminophen  650 mg Q6HRS PRN       Physical Exam   Vitals:    06/04/20 1100 06/04/20 1130 06/04/20 1157 06/04/20 1200   BP: 102/49 133/59  130/64   Pulse: 78 84  89   Resp: 18 17  18   Temp: 36.2 °C (97.1 °F) 36.2 °C (97.2 °F)  36.1 °C (96.9 °F)   TempSrc: Temporal Temporal  Temporal   SpO2: 93% 96%  94%   Weight:   90.5 kg (199 lb 8.3 oz)    Height:            Physical Exam   Constitutional: He is oriented to person, place, and time and well-developed, well-nourished, and in no distress. No distress.   HENT:   Head: Normocephalic and atraumatic.   Eyes: Pupils are equal, round, and reactive to light. Conjunctivae are normal. No scleral icterus.   Neck: Normal range of motion. Neck supple. No JVD present.   Cardiovascular: Normal rate, regular rhythm and normal heart sounds. Exam reveals no gallop and no friction rub.   No murmur heard.  Pulmonary/Chest: Effort normal and breath sounds normal. No respiratory distress. He has no wheezes. He has no rales. He exhibits no tenderness.   Abdominal: Soft. Bowel sounds are normal. There is no abdominal tenderness. There is no rebound.   Musculoskeletal: Normal range of motion.         General: No tenderness, deformity or edema.   Neurological: He is alert and oriented to person, place, and time.   Skin: Skin is warm and dry. No rash noted. He is not diaphoretic. No erythema.   Psychiatric: Mood and affect normal.   Nursing note and vitals reviewed.         Laboratory   Recent Labs     06/03/20 2051 06/04/20 0101   WBC 15.5* 12.3*   RBC 4.69* 4.69*   HEMOGLOBIN 14.6 14.6   HEMATOCRIT 41.7* 42.6   PLATELETCT 209 193     Recent Labs     06/03/20 2051 06/04/20 0101   SODIUM 135 137   POTASSIUM 3.8 4.0   CHLORIDE 102 104   CO2 21 26   GLUCOSE 116* 90   BUN 28* 29*   CREATININE 0.86 0.96      Recent Labs     06/03/20 2051 06/04/20 0101   ALTSGPT 19  --    ASTSGOT 38  --    ALKPHOSPHAT 52  --    TBILIRUBIN 0.6  --    GLUCOSE 116* 90      Recent Labs     06/04/20 0101 06/04/20  0910   APTT 99.4* 138.0*           Microbiology  Results     Procedure Component Value Units Date/Time    URINE CULTURE-EXISTING-LESS THAN 48 HOURS [[id number]] Collected:  05/04/20 0255    Order Status:  Completed Specimen:  Urine, Clean Catch Updated:  06/04/20 0829    Narrative:       Indication for culture:->Patient WITHOUT an indwelling  Hyde  catheter in place with new onset of Dysuria, Frequency,  Urgency, and/or Suprapubic pain    URINALYSIS [[id number]]  (Abnormal) Collected:  06/04/20 0255    Order Status:  Completed Specimen:  Urine, Clean Catch Updated:  06/04/20 0403     Color Yellow     Character Hazy     Specific Gravity 1.020     Ph 7.0     Glucose Negative mg/dL      Ketones Trace mg/dL      Protein 30 mg/dL      Bilirubin Negative     Urobilinogen, Urine 2.0     Nitrite Positive     Leukocyte Esterase Trace     Occult Blood Small     Micro Urine Req Microscopic    Narrative:       Collected By:[id number] EMILY COBOS             Labs reviewed by me and noted above.    Radiology  Echo  No prior study is available for comparison.   Only parasternal and some apical views could be obtained as patient   became agitated midway through the study. Based on images obtained -  Low normal left ventricular systolic function. Left ventricular   ejection fraction is visually estimated to be 50%.   Diastolic function is difficult to assess with tachycardia.     EKG performed 6/4 was personally reviewed and per my interpretation shows v-paced rhythm.  QTc calculated by me to be 472.          Assessment and Plan      Principal Problem:    NSTEMI (non-ST elevated myocardial infarction) (HCC) POA: Yes  Active Problems:    HTN (hypertension) POA: Yes    BPH (benign prostatic hyperplasia) POA: Yes    Acute cystitis without hematuria POA: Yes    HFrEF, history of EF 25%    S/p ICD    Seasonal allergies    CAD    Clean coronaries on cardiac angiogram today with slightly improvement in ejection fraction.  Echocardiogram result reviewed, ejection fraction 50%.  Continue medical management of his chronic medical condition.  I start ceftriaxone for urinary tract infection, which is a new problem as evidenced on urinalysis.  Order urine culture.  DC heparin drip  Restart Coreg 6.25mg PO BID      DVT prophylaxis: SCD    CODE STATUS:  FULL CODE    Disposition:  Anticipate Home in 1 days.    Case was discussed with Primary RN, Charge Nurse, Medical SW, , and Pharmacist on IDTround and Pharmacist in addition to individual(s) mentioned above.    I have performed a physical exam and reviewed and updated ROS as of today, 6/4/2020.  In review of yesterday's note dated, 6/3/2020, there are no changes except as documented above.    I spent 38 minutes in evaluating and treating the patient, more than 50% of the time in direct evaluation and coordinating care.      Ellyn Blanco M.D.

## 2020-06-04 NOTE — CONSULTS
Reason for Consult:  Asked by Dr Kayla Shin M.D. to see this patient with nstemi    CC: fatigue, tachypnea    HPI:      75 year old man with PMH HFrEF 25% now improved, LBBB s/p CRT-ICD c/b hx defib (initially [employer] 2010, replaced 2017 to medtronic) presents after trip to [facility]. Arrived yesterday. Shortly developed fatigue and muscle aches. Progressively also developed tachypnea. Saught medical attention at [facility] where found abnormal EKG with TWI and troponin elevation and sinus tach. Also received NaCl IVF. This was likely due that he also complained of naussea, vomitting, diarrhea at the time.Transfer for further management by day-time cards consult service. Patient denies chest pain/pressure ever. Currently improved at lower altitude and on nasal cannula; tachypnea resolved. No other complaints at this time.    Medications / Drug list prior to admission:  No current facility-administered medications on file prior to encounter.      No current outpatient medications on file prior to encounter.       Current list of administered Medications:  No current facility-administered medications for this encounter.     No past medical history on file.    No past surgical history on file.    No family history on file.  Patient family history was personally reviewed, no pertinent family history to current presentation    Social History     Socioeconomic History   • Marital status: Not on file     Spouse name: Not on file   • Number of children: Not on file   • Years of education: Not on file   • Highest education level: Not on file   Occupational History   • Not on file   Social Needs   • Financial resource strain: Not on file   • Food insecurity     Worry: Not on file     Inability: Not on file   • Transportation needs     Medical: Not on file     Non-medical: Not on file   Tobacco Use   • Smoking status: Not on file   Substance and Sexual Activity   • Alcohol use: Not on file   • Drug use:  Not on file   • Sexual activity: Not on file   Lifestyle   • Physical activity     Days per week: Not on file     Minutes per session: Not on file   • Stress: Not on file   Relationships   • Social connections     Talks on phone: Not on file     Gets together: Not on file     Attends [employer] service: Not on file     Active member of club or organization: Not on file     Attends meetings of clubs or organizations: Not on file     Relationship status: Not on file   • Intimate partner violence     Fear of current or ex partner: Not on file     Emotionally abused: Not on file     Physically abused: Not on file     Forced sexual activity: Not on file   Other Topics Concern   • Not on file   Social History Narrative   • Not on file       ALLERGIES:  Allergies not on file    Review of systems:  A complete review of symptoms was reviewed with patient. This is reviewed in H&P and PMH. ALL OTHERS reviewed and negative    Physical exam:  Patient Vitals for the past 24 hrs:   BP Temp Temp src Pulse Resp SpO2 Weight   06/03/20 1942 101/59 36.6 °C (97.9 °F) Temporal 84 16 97 % 94.1 kg (207 lb 7.3 oz)     General: No acute distress.   EYES: no jaundice  HEENT: OP clear   Neck: No bruits No JVD.   CVS: RRR. S1 + S2. No M/R/G. No edema.  Resp: CTAB. No wheezing or crackles/rhonchi.  Abdomen: Soft, NT, ND,  Skin: Grossly nothing acute no obvious rashes  Neurological: Alert, Moves all extremities, no cranial nerve defects on limited exam  Extremities: Pulse 2+ in b/l LE. No cyanosis.     Data:  Laboratory studies personally reviewed by me:  No results found for this or any previous visit (from the past 24 hour(s)).    Outside EKG :  AS-BiV paced with TWI (unclear if memory T or ischemic)    All pertinent features of laboratory and imaging reviewed including primary images where applicable      Active Problems:    * No active hospital problems. *  Resolved Problems:    * No resolved hospital problems. *      Assessment / Plan:    75  year old man with PMH HFrEF 25% now improved, LBBB s/p CRT-ICD c/b hx defib (initially [employer] 2010, replaced 2017 to medtronic) presents after trip to [facility] developed n/v, diarrhea, malaise, mm weakness, tachypnea, found nstemi; transferred from [facility] for further management.    -continue home heart failure medications lisinopril and carvedilol  -aspirin load 325mg if not already done so and continue 81mg daily  -heparin gtt  -plan LHC in am  -device interrogation routine (medtronic)  -repeat TTE  -obtain records from Dr Salvador general cards and Dr Schuler EP in [place], [place]    I personally discussed his case with Dr Shin    It is my pleasure to participate in the care of Mr. Quintero.  Please do not hesitate to contact me with questions or concerns.    Deyvi Mendoza MD  Cardiologist [facility]

## 2020-06-04 NOTE — PROGRESS NOTES
Spiritual Care Note    Patient Information     Patient's Name: Christopher Quintero   MRN: [id number]    YOB: 1945   Age and Gender: 75 y.o. male   Service Area: Telemetry   Room (and Bed): [place] [address]   Ethnicity or Nationality:     Primary Language: English   Faith/Spiritual preference: No Sikh on file   Place of Residence: [place], [place]   Family/Friends/Others Present: No   Clinical Team Present: No   Medical Diagnosis(-es)/Procedure(s): NSTEMI   Code Status: Full Code    Date of Admission: 6/3/2020   Length of Stay: 1 days        Spiritual Care Provider Information:  Name of Spiritual Care Provider: Lo Raza  Title of Spiritual Care Provider: Associate   Phone Number: [phone]  E-mail: [email or web address]  Total time : 5 minutes    Spiritual Screen Results:    Gen Nursing  Spiritual Screen  Is your spiritual health or inner well-being important to you as you cope with your medical condition?: Yes  Would you like to receive a visit from our Spiritual Care team or your own Restorationist or spiritual leader?: Yes  Was spiritual care education provided to the patient?: Yes  Cultural/Spiritual Needs During Care: Ceremonial  Ceremonial Considerations: Other (Comment)     Palliative Care  PC Faith/Spiritual Screening  Was spiritual care education provided to the patient?: Yes      Encounter/Request Information  Encounter/Request Type   Visited With: Patient  Nature of the Visit: Initial, On shift  General Visit: Yes  Referral From/ Origin of Request: Epic nursing    Religous Needs/Values       Spiritual Assessment     Spiritual Care Encounters    Interaction/Conversation: Pt thanked the  for stopping by, but declined a visit.    Plan: Visit Upon Request    Notes:

## 2020-06-04 NOTE — DISCHARGE PLANNING
Care Transition Team Assessment    RN HUGO spoke with patient to complete assessment.  Patient lives at home on his own.  Was in [place] and was transferred here for care.  Patient in independent in all ADLs and denies any needs.  States he is established with a PCP in [place].  Requested assistance with transportation to [place] post discharge.      DC Plan:  Home w/ no needs; will assist with transportation options to get back to [place].    Information Source  Orientation : Oriented x 4  Information Given By: Patient  Who is responsible for making decisions for patient? : Patient    Readmission Evaluation  Is this a readmission?: Yes - unplanned readmission    Elopement Risk  Legal Hold: No  Elopement Risk: Not at Risk for Elopement    Interdisciplinary Discharge Planning  Primary Care Physician: PCP in [place]  Lives with - Patient's Self Care Capacity: Alone and Able to Care For Self  Patient or legal guardian wants to designate a caregiver (see row info): No  Support Systems: Friends / Neighbors  Housing / Facility: [address]  Do You Take your Prescribed Medications Regularly: Yes  Able to Return to Previous ADL's: Yes  Mobility Issues: No  Prior Services: None  Patient Expects to be Discharged to:: home  Assistance Needed: No  Durable Medical Equipment: Not Applicable    Discharge Preparedness  What is your plan after discharge?: Home with help  What are your discharge supports?: Other (comment)(friends)  Prior Functional Level: Ambulatory, Drives Self, Independent with Activities of Daily Living, Independent with Medication Management  Difficulity with ADLs: None  Difficulity with IADLs: None    Functional Assesment  Prior Functional Level: Ambulatory, Drives Self, Independent with Activities of Daily Living, Independent with Medication Management    Finances  Financial Barriers to Discharge: No  Prescription Coverage: Yes    Vision / Hearing Impairment  Vision Impairment : Yes  Right Eye Vision: Wears  Glasses  Left Eye Vision: Wears Glasses  Hearing Impairment : No         Advance Directive  Advance Directive?: None  Advance Directive offered?: AD Booklet refused    Domestic Abuse  Have you ever been the victim of abuse or violence?: No  Physical Abuse or Sexual Abuse: No  Verbal Abuse or Emotional Abuse: No  Possible Abuse Reported to:: Not Applicable    Psychological Assessment  History of Substance Abuse: None  History of Psychiatric Problems: No  Non-compliant with Treatment: No    Discharge Risks or Barriers  Discharge risks or barriers?: Transportation    Anticipated Discharge Information  Anticipated discharge disposition: Home  Discharge Address: [address] [place] [address] [address] [place] [id number]  Discharge Contact Phone Number: [phone]

## 2020-06-04 NOTE — PROGRESS NOTES
Cardiovascular Nurse Navigator Note:    Patient has nSTEMI diagnosis.     Management: cardiac cath 6/4/2020 shows non obstructive CAD. Please see below for ACS measures.     Current assessment of LV Function shows: EF 50% per echo 6/4/2020.    Demographics  Patient resides in: [place]  Insurance: MediCal/ A&B    Follow up care    Patient recommended to follow up with his [facility] cardiologist, Dr. Neftaly Salvador at [facility] [phone]      ACS Measures:  1. ASA prescribed at discharge  2. Beta blockade prescribed at discharge, if patient also has HFrEF (EF less than or equal to 40%), this needs to be one of the three evidence based beta blockers: carvedilol, bisoprolol, Toprol XL  3. High intensity statin prescribed at discharge (atorvastatin 40 mg or rosuvastatin 20 mg)  4. ACE-I or ARB prescribed on discharge for LVEF less than 40%  5. Aldosterone blockade prescribed for patients with EF less than 40% AND history of diabetes mellitus OR history of heart failure, heart failure on presentation or heart failure as an in-hospital event  6. ICR referral order is placed  7. Use the Acute Coronary Syndrome discharge instructions to document that patient has been provided with the contact information for ICR   8. Evaluation of LV systolic function can be by angiogram, or echo before discharge, or within past year, cannot be a future plan for LVSF assessment  9. ACS education is documented daily  1. For anyone who has had PCI, initiate Meds to Beds: Monday through Friday 9-5 call [phone]. All other times, call [phone] and ask to page the on-call [facility] pharmacist.  10. Smoking cessation counseling    What if any of the above ACS Measures are contraindicated?  ? Request that the discharging provider document the medication/intervention and the contraindication specifically in a progress note  ? For example: “no ACE-I meds due to hypotension” is not enough. It needs to say: “No ACE-I, ARNI, ARB due to  hypotension”; “No Beta Blockade due to bradycardia”…

## 2020-06-04 NOTE — PROGRESS NOTES
Bedside report received from Kenia RN. Assumed care of patient. Pt. resting comfortably without any sign of distress. A&Ox4, VSS, no complaints at this time. POC reviewed and white board updated, Tele box on, Call light in reach, Bed locked in lowest position.

## 2020-06-04 NOTE — PROGRESS NOTES
Cardiology Follow Up Progress Note    Date of Service  6/4/2020    Attending Physician  Ellyn Blanco M.D.    Chief Complaint   Fatigue, muscle aches, tachypnea, nausea, vomiting, diarrhea, went to [facility] found to have an abnormal EKG with troponin elevation, sinus tach, patient was camping by himself in altitude of 9000 feet.    LILLIAM Quintero is a 75 y.o. male admitted 6/3/2020 with management for NSTEMI.          Past medical history significant for HFrEF 25% now improved, left bundle status post CRT-ICD (initially seen 2010, replaced 2017 with [employer])    Interim Events  6/4/20 new UTI, on Rocephin, significant shivering overnight, no recurrence this morning, underwent coronary angiography revealed nonobstructive CAD, no overnight arrhythmia, paced rhythm.    Blood pressure 130/60  Rhythm paced      Labs reviewed  Leukocytosis, trending down  Sodium, potassium, creatinine stable  Trop peaked at 310    Review of Systems  Review of Systems   Respiratory: Negative for apnea, cough, choking, chest tightness, shortness of breath, wheezing and stridor.    Cardiovascular: Negative for chest pain and leg swelling.       Vital signs in last 24 hours  Temp:  [35.9 °C (96.7 °F)-37.3 °C (99.2 °F)] 36.2 °C (97.2 °F)  Pulse:  [] 73  Resp:  [16-18] 18  BP: ()/(43-74) 103/53  SpO2:  [91 %-98 %] 97 %    Physical Exam  Physical Exam  Cardiovascular:      Pulses: Normal pulses.      Comments: Paced rhythm   Pulmonary:      Effort: Pulmonary effort is normal.   Abdominal:      General: Abdomen is flat.   Skin:     General: Skin is warm.      Comments: Right radial cath site with Hemoband, intact, good circulation   Neurological:      General: No focal deficit present.      Mental Status: He is alert.   Psychiatric:         Mood and Affect: Mood normal.         Lab Review  Lab Results   Component Value Date/Time    WBC 12.3 (H) 06/04/2020 01:01 AM    RBC 4.69 (L) 06/04/2020 01:01 AM    HEMOGLOBIN 14.6  06/04/2020 01:01 AM    HEMATOCRIT 42.6 06/04/2020 01:01 AM    MCV 90.8 06/04/2020 01:01 AM    MCH 31.1 06/04/2020 01:01 AM    MCHC 34.3 06/04/2020 01:01 AM    MPV 10.3 06/04/2020 01:01 AM      Lab Results   Component Value Date/Time    SODIUM 137 06/04/2020 01:01 AM    POTASSIUM 4.0 06/04/2020 01:01 AM    CHLORIDE 104 06/04/2020 01:01 AM    CO2 26 06/04/2020 01:01 AM    GLUCOSE 90 06/04/2020 01:01 AM    BUN 29 (H) 06/04/2020 01:01 AM    CREATININE 0.96 06/04/2020 01:01 AM      Lab Results   Component Value Date/Time    ASTSGOT 38 06/03/2020 08:51 PM    ALTSGPT 19 06/03/2020 08:51 PM     Lab Results   Component Value Date/Time    TROPONINT 269 (H) 06/04/2020 01:01 AM       No results for input(s): NTPROBNP in the last 72 hours.    Cardiac Imaging and Procedures Review  EKG: Ventricularly paced    Echocardiogram: 6/4/20    Low normal left ventricular systolic function. Left ventricular ejection fraction is visually estimated to be 50%.   Diastolic function is difficult to assess with tachycardia.    Cardiac Catheterization: 6/4/2020 nonobstructive CAD, elevated LVEDP, 20 mmHg    Imaging      Stress Test:  N/A    Assessment      NSTEMI  HFrEF 25% ( per patient report hx of severe alcohol use ), now improved, echo 6/3/20 showed  LVEF 50%   LBBB, chronic  CRT-ICD (initially [place] 2010, replaced 2017 [employer])  Diarrhea, resolved  Malaise, feeling better  Weakness, feeling better  Significant shivering, recurrence last night, no recurrence this morning  Tachypnea, improved  UTI, new, on Rocephin  Severe seasonal allergies, resume home meds        Plan  S/p coronary angiography 6/4/2020 revealed nonobstructive CAD.  Echo with EF of 50%.  Resume carvedilol when blood pressure permits.  No further cardiac work-up.  Patient is to follow-up with his own cardiologist in [place].  Cardiology will sign off.      Please contact me with any questions.    JOSUE Lyn   Cardiologist, [facility]  [phone]

## 2020-06-04 NOTE — H&P
Hospital Medicine History & Physical Note    Date of Service  6/3/2020    Primary Care Physician  No primary care provider on file.    Code Status  Full Code    Chief Complaint  Direct Admission from [facility] for NSTEMI    History of Presenting Illness  75 y.o. male, with history of systolic CHF (reported EF of 25 %) and pacemaker, who is being direct Admitted to [facility] on 6/3/2020 for management of non-STEMI.  Patient was evaluated today over at [facility].  He initially presented to the hospital complaining of overall not feeling well, very tired, also  feeling nauseated and at a time short of breath.  Patient went camping by himself at an altitude of 9000 feet.  He reports that started not feeling well last evening, was nauseated and also had some chills.  He decided to wait all night at camp and since he was a still not fine this morning decided to seek medical care.    Laboratories prior to transfer were significantly for having a troponin level of 3.125 that subsequently increased to 3.418.  Additionally he had significantly elevated WBC at 23.39.  He also had a COVID-19 test done that was negative.  EKG did not show acute ischemia. The case was discussed with Cardiology, Dr. Ceja who agreed to see the patient.     Patient at this time is doing well, he reports feeling better.  He denies having any chest pain at this time.    Review of Systems  Review of Systems   Constitutional: Positive for malaise/fatigue. Negative for fever.   HENT: Negative for congestion and sore throat.    Eyes: Negative for blurred vision and double vision.   Respiratory: Negative for cough and shortness of breath.    Cardiovascular: Negative for chest pain and palpitations.   Gastrointestinal: Negative for nausea and vomiting.   Genitourinary: Negative for dysuria and urgency.   Musculoskeletal: Negative for myalgias and neck pain.   Skin: Negative for itching and rash.   Neurological: Negative for dizziness,  weakness and headaches.   Endo/Heme/Allergies: Does not bruise/bleed easily.   Psychiatric/Behavioral: Negative for depression. The patient does not have insomnia.        Past Medical History  CHF, Pacemaker, HTN, BPH    Surgical History  Pacemaker placement    Family History  Father had  of a Spinal tumor at age 63. Mother had MI and he does not recall what age.    Social History   Former smoker quit in .  Denies regular use of alcohol.  He denies recreational drug use.    Allergies  Pravastatin    Medications  None       Physical Exam  Temp:  [36.6 °C (97.9 °F)] 36.6 °C (97.9 °F)  Pulse:  [84] 84  Resp:  [16] 16  BP: (101)/(59) 101/59  SpO2:  [97 %] 97 %    Physical Exam  Constitutional:       Appearance: Normal appearance.   HENT:      Head: Normocephalic and atraumatic.      Nose: Nose normal.      Mouth/Throat:      Mouth: Mucous membranes are moist.      Pharynx: Oropharynx is clear.   Eyes:      Extraocular Movements: Extraocular movements intact.      Pupils: Pupils are equal, round, and reactive to light.   Neck:      Musculoskeletal: Normal range of motion and neck supple.   Cardiovascular:      Rate and Rhythm: Normal rate and regular rhythm.      Pulses: Normal pulses.      Heart sounds: Murmur (4/6 Systolic Murmur) present.   Pulmonary:      Effort: Pulmonary effort is normal.      Breath sounds: Normal breath sounds.   Abdominal:      General: Abdomen is flat. Bowel sounds are normal.      Palpations: Abdomen is soft.   Skin:     General: Skin is warm and dry.   Neurological:      General: No focal deficit present.      Mental Status: He is alert and oriented to person, place, and time.   Psychiatric:         Mood and Affect: Mood normal.         Behavior: Behavior normal.         Laboratory:          No results for input(s): ALTSGPT, ASTSGOT, ALKPHOSPHAT, TBILIRUBIN, DBILIRUBIN, GAMMAGT, AMYLASE, LIPASE, ALB, PREALBUMIN, GLUCOSE in the last 72 hours.      No results for input(s): NTPROBNP in  the last 72 hours.      No results for input(s): TROPONINT in the last 72 hours.    Imaging:  No orders to display     EKG: A flutter with ventricular paced rhythm at 78 bpm    Assessment/Plan:    * NSTEMI (non-ST elevated myocardial infarction) ([facility])  Assessment & Plan  -Direct Admission from [facility].   -Troponin prior to transfer: 3.125 and then 3.418.  -Patient was seen and evaluated by cardiologist here.  -Continue heparin  -Patient will be taken to the Cath Lab in the morning and echocardiogram that was ordered  -Cardiologist requested prior medical records  -Appreciate cardiology consult and recommendations: I personally discussed this case with .  -Added laboratories including serial cardiac enzymes overnight.  -He is chest pain-free at this time.  -Patient does have a pacemaker will be interrogated.    Leukocytosis  Assessment & Plan  -Likely reactive, improved from 23 prior transfer to 16.   -There is no clear indications of infection at this time.  -He did received IV fluids prior to transfer.  -Monitor morning labs.    BPH (benign prostatic hyperplasia)  Assessment & Plan  -Continue tamsulosin    HTN (hypertension)  Assessment & Plan  -Continue carvedilol and lisinopril, blood pressure now is 101/59      DVT Ppx: Heparin gtt per ACS protocol

## 2020-06-05 ENCOUNTER — PATIENT OUTREACH (OUTPATIENT)
Dept: HEALTH INFORMATION MANAGEMENT | Facility: OTHER | Age: 75
End: 2020-06-05

## 2020-06-05 VITALS
WEIGHT: 198.85 LBS | TEMPERATURE: 98.3 F | HEIGHT: 70 IN | OXYGEN SATURATION: 95 % | RESPIRATION RATE: 18 BRPM | HEART RATE: 70 BPM | DIASTOLIC BLOOD PRESSURE: 64 MMHG | BODY MASS INDEX: 28.47 KG/M2 | SYSTOLIC BLOOD PRESSURE: 140 MMHG

## 2020-06-05 PROBLEM — N30.00 ACUTE CYSTITIS WITHOUT HEMATURIA: Status: RESOLVED | Noted: 2020-06-03 | Resolved: 2020-06-05

## 2020-06-05 PROBLEM — I21.4 NSTEMI (NON-ST ELEVATED MYOCARDIAL INFARCTION) (HCC): Status: RESOLVED | Noted: 2020-06-03 | Resolved: 2020-06-05

## 2020-06-05 LAB
ANION GAP SERPL CALC-SCNC: 12 MMOL/L (ref 7–16)
BASOPHILS # BLD AUTO: 0.3 % (ref 0–1.8)
BASOPHILS # BLD: 0.02 K/UL (ref 0–0.12)
BUN SERPL-MCNC: 20 MG/DL (ref 8–22)
CALCIUM SERPL-MCNC: 8.9 MG/DL (ref 8.5–10.5)
CHLORIDE SERPL-SCNC: 102 MMOL/L (ref 96–112)
CO2 SERPL-SCNC: 22 MMOL/L (ref 20–33)
CREAT SERPL-MCNC: 0.69 MG/DL (ref 0.5–1.4)
EOSINOPHIL # BLD AUTO: 0.05 K/UL (ref 0–0.51)
EOSINOPHIL NFR BLD: 0.7 % (ref 0–6.9)
ERYTHROCYTE [DISTWIDTH] IN BLOOD BY AUTOMATED COUNT: 43.8 FL (ref 35.9–50)
GLUCOSE SERPL-MCNC: 104 MG/DL (ref 65–99)
HCT VFR BLD AUTO: 40 % (ref 42–52)
HGB BLD-MCNC: 13.9 G/DL (ref 14–18)
IMM GRANULOCYTES # BLD AUTO: 0.03 K/UL (ref 0–0.11)
IMM GRANULOCYTES NFR BLD AUTO: 0.4 % (ref 0–0.9)
LYMPHOCYTES # BLD AUTO: 0.86 K/UL (ref 1–4.8)
LYMPHOCYTES NFR BLD: 11.2 % (ref 22–41)
MCH RBC QN AUTO: 31.5 PG (ref 27–33)
MCHC RBC AUTO-ENTMCNC: 34.8 G/DL (ref 33.7–35.3)
MCV RBC AUTO: 90.7 FL (ref 81.4–97.8)
MONOCYTES # BLD AUTO: 1.15 K/UL (ref 0–0.85)
MONOCYTES NFR BLD AUTO: 15 % (ref 0–13.4)
NEUTROPHILS # BLD AUTO: 5.54 K/UL (ref 1.82–7.42)
NEUTROPHILS NFR BLD: 72.4 % (ref 44–72)
NRBC # BLD AUTO: 0 K/UL
NRBC BLD-RTO: 0 /100 WBC
PLATELET # BLD AUTO: 169 K/UL (ref 164–446)
PMV BLD AUTO: 10.2 FL (ref 9–12.9)
POTASSIUM SERPL-SCNC: 3.9 MMOL/L (ref 3.6–5.5)
RBC # BLD AUTO: 4.41 M/UL (ref 4.7–6.1)
SODIUM SERPL-SCNC: 136 MMOL/L (ref 135–145)
WBC # BLD AUTO: 7.7 K/UL (ref 4.8–10.8)

## 2020-06-05 PROCEDURE — 700111 HCHG RX REV CODE 636 W/ 250 OVERRIDE (IP): Performed by: INTERNAL MEDICINE

## 2020-06-05 PROCEDURE — A9270 NON-COVERED ITEM OR SERVICE: HCPCS | Performed by: INTERNAL MEDICINE

## 2020-06-05 PROCEDURE — 85025 COMPLETE CBC W/AUTO DIFF WBC: CPT

## 2020-06-05 PROCEDURE — 700102 HCHG RX REV CODE 250 W/ 637 OVERRIDE(OP): Performed by: NURSE PRACTITIONER

## 2020-06-05 PROCEDURE — 700102 HCHG RX REV CODE 250 W/ 637 OVERRIDE(OP): Performed by: INTERNAL MEDICINE

## 2020-06-05 PROCEDURE — A9270 NON-COVERED ITEM OR SERVICE: HCPCS | Performed by: HOSPITALIST

## 2020-06-05 PROCEDURE — 80048 BASIC METABOLIC PNL TOTAL CA: CPT

## 2020-06-05 PROCEDURE — A9270 NON-COVERED ITEM OR SERVICE: HCPCS | Performed by: NURSE PRACTITIONER

## 2020-06-05 PROCEDURE — 99239 HOSP IP/OBS DSCHRG MGMT >30: CPT | Performed by: INTERNAL MEDICINE

## 2020-06-05 PROCEDURE — 700105 HCHG RX REV CODE 258: Performed by: INTERNAL MEDICINE

## 2020-06-05 PROCEDURE — 36415 COLL VENOUS BLD VENIPUNCTURE: CPT

## 2020-06-05 PROCEDURE — 700102 HCHG RX REV CODE 250 W/ 637 OVERRIDE(OP): Performed by: HOSPITALIST

## 2020-06-05 RX ORDER — CEFDINIR 300 MG/1
300 CAPSULE ORAL 2 TIMES DAILY
Qty: 10 CAP | Refills: 0 | Status: SHIPPED | OUTPATIENT
Start: 2020-06-05 | End: 2020-06-10

## 2020-06-05 RX ORDER — CEFDINIR 300 MG/1
300 CAPSULE ORAL 2 TIMES DAILY
Qty: 10 CAP | Refills: 0 | Status: SHIPPED | OUTPATIENT
Start: 2020-06-05 | End: 2020-06-05 | Stop reason: SDUPTHER

## 2020-06-05 RX ADMIN — DOCUSATE SODIUM 50 MG AND SENNOSIDES 8.6 MG 2 TABLET: 8.6; 5 TABLET, FILM COATED ORAL at 05:45

## 2020-06-05 RX ADMIN — CEFTRIAXONE SODIUM 2 G: 2 INJECTION, POWDER, FOR SOLUTION INTRAMUSCULAR; INTRAVENOUS at 05:45

## 2020-06-05 RX ADMIN — GUAIFENESIN 600 MG: 600 TABLET, EXTENDED RELEASE ORAL at 05:45

## 2020-06-05 RX ADMIN — TAMSULOSIN HYDROCHLORIDE 0.4 MG: 0.4 CAPSULE ORAL at 08:06

## 2020-06-05 RX ADMIN — MONTELUKAST SODIUM 10 MG: 10 TABLET, COATED ORAL at 05:45

## 2020-06-05 RX ADMIN — DIPHENHYDRAMINE HYDROCHLORIDE 50 MG: 25 TABLET ORAL at 02:09

## 2020-06-05 RX ADMIN — CARVEDILOL 6.25 MG: 6.25 TABLET, FILM COATED ORAL at 08:06

## 2020-06-05 SDOH — ECONOMIC STABILITY: TRANSPORTATION INSECURITY
IN THE PAST 12 MONTHS, HAS THE LACK OF TRANSPORTATION KEPT YOU FROM MEDICAL APPOINTMENTS OR FROM GETTING MEDICATIONS?: NO

## 2020-06-05 SDOH — ECONOMIC STABILITY: INCOME INSECURITY: HOW HARD IS IT FOR YOU TO PAY FOR THE VERY BASICS LIKE FOOD, HOUSING, MEDICAL CARE, AND HEATING?: NOT HARD AT ALL

## 2020-06-05 SDOH — ECONOMIC STABILITY: FOOD INSECURITY: WITHIN THE PAST 12 MONTHS, THE FOOD YOU BOUGHT JUST DIDN'T LAST AND YOU DIDN'T HAVE MONEY TO GET MORE.: NEVER TRUE

## 2020-06-05 SDOH — ECONOMIC STABILITY: FOOD INSECURITY: WITHIN THE PAST 12 MONTHS, YOU WORRIED THAT YOUR FOOD WOULD RUN OUT BEFORE YOU GOT MONEY TO BUY MORE.: NEVER TRUE

## 2020-06-05 SDOH — ECONOMIC STABILITY: TRANSPORTATION INSECURITY
IN THE PAST 12 MONTHS, HAS LACK OF TRANSPORTATION KEPT YOU FROM MEETINGS, WORK, OR FROM GETTING THINGS NEEDED FOR DAILY LIVING?: NO

## 2020-06-05 ASSESSMENT — FIBROSIS 4 INDEX: FIB4 SCORE: 3.34

## 2020-06-05 NOTE — CARE PLAN
Problem: Communication  Goal: The ability to communicate needs accurately and effectively will improve  Outcome: PROGRESSING AS EXPECTED     Problem: Safety  Goal: Will remain free from falls  Outcome: PROGRESSING AS EXPECTED  Intervention: Implement fall precautions  Flowsheets (Taken 6/4/2020 2000)  Environmental Precautions:   Treaded Slipper Socks on Patient   Personal Belongings, Wastebasket, Call Bell etc. in Easy Reach   Transferred to Stronger Side   Report Given to Other Health Care Providers Regarding Fall Risk   Bed in Low Position   Communication Sign for Patients & Families   Mobility Assessed & Appropriate Sign Placed     Problem: Knowledge Deficit  Goal: Knowledge of disease process/condition, treatment plan, diagnostic tests, and medications will improve  Outcome: PROGRESSING AS EXPECTED

## 2020-06-05 NOTE — DISCHARGE SUMMARY
"HOSPITAL MEDICINE DISCHARGE SUMMARY    DATE OF ADMISSION:  6/3/2020    DATE OF DISCHARGE:  6/5/2020    CHIEF COMPLAINT ON ADMISSION  No chief complaint on file.      CODE STATUS  Prior    Allergies   Allergen Reactions   • Statins [Hmg-Coa-R Inhibitors]        DISCHARGE PROBLEM LIST  Principal Problem (Resolved):    NSTEMI (non-ST elevated myocardial infarction) (HCC) POA: Yes  Active Problems:    HTN (hypertension) POA: Yes    BPH (benign prostatic hyperplasia) POA: Yes  Resolved Problems:    Acute cystitis without hematuria POA: Yes      MEDICATIONS ON DISCHARGE     Medication List      START taking these medications      Instructions   cefdinir 300 MG Caps  Commonly known as:  OMNICEF   Take 1 Cap by mouth 2 times a day for 5 days.  Dose:  300 mg        CONTINUE taking these medications      Instructions   acetaminophen 500 MG Tabs  Commonly known as:  TYLENOL   Take 1,000 mg by mouth every 6 hours as needed for Moderate Pain. Per pt. \"Urologist suggested this for headaches prevention\"   Indications: Pain  Dose:  1,000 mg     carvedilol 6.25 MG Tabs  Commonly known as:  COREG   Take 6.25 mg by mouth 2 times a day, with meals.  Dose:  6.25 mg     diphenhydrAMINE 50 MG Caps  Commonly known as:  BENADRYL   Take 50 mg by mouth every four hours as needed for Itching. Indications: Hayfever, Sneezing  Dose:  50 mg     guaiFENesin  MG Tb12  Commonly known as:  MUCINEX   Take 600 mg by mouth 2 Times a Day.  Dose:  600 mg     montelukast 10 MG Tabs  Commonly known as:  SINGULAIR   Take 10 mg by mouth every day. Indications: Hayfever  Dose:  10 mg     tamsulosin 0.4 MG capsule  Commonly known as:  FLOMAX   Take 0.4 mg by mouth ONE-HALF HOUR AFTER BREAKFAST. Indications: Benign Enlargement of Prostate  Dose:  0.4 mg            CONSULTATIONS  Cardiology    HPI & HOSPITAL COURSE  75-year-old man who has a history of coronary artery disease, dilated cardiomyopathy with EF 25%, h/o ICD, presented with chest pain with " positive troponin consistent with a NSTEMI from OSH.  Patient visited [place] on a camping trip, experienced chest pain while camping at about 9000 feet, presented to an OSH and was transferred here to a higher level of care.      He was evaluated by the Cardiology team, who recommended angiogram.  Angiogram showed nonobstructive coronary artery disease, reduced left ventricular systolic function with ejection fraction of 30%, and elevated left ventricular end-diastolic pressure.  He is recommended to continue medical management after discharge.    Work up also shows evidence of UTI, consistent with symptom of dysuria.  This was treated with ceftriaxone.  Dysuria resolved after antibiotic initiated.  A few more days of 3rd-gen cephalosporin was prescribed at discharge to complete a course.  He did not have fever, chills, or leukocytosis.      Therefore, he is discharged in good and stable condition to home with close outpatient follow-up.    The patient met 2-midnight criteria for an inpatient stay at the time of discharge.    SPECIFIC OUTPATIENT FOLLOW-UP RECOMMENDATIONS  Routine follow up with Cardiologist in [place]  Follow up with PCP to eval resolution of UTI    FOLLOW UP  Your Cardiologist in [place]    Schedule an appointment as soon as possible for a visit in 2 weeks      Primary Care Physician      Please call to schedule your hospital follow up with your Primary Care Physician when you return home. Thank you       DIET  Resume home diet previous to admission    ACTIVITY  Resume previous level of activities.    PROCEDURES  Cardiac cath    PERTINENT RESULTS  Recent Labs     06/03/20 2051 06/04/20 0101 06/05/20  0320   WBC 15.5* 12.3* 7.7   RBC 4.69* 4.69* 4.41*   HEMOGLOBIN 14.6 14.6 13.9*   HEMATOCRIT 41.7* 42.6 40.0*   MCV 88.9 90.8 90.7   MCH 31.1 31.1 31.5   MCHC 35.0 34.3 34.8   RDW 42.7 43.7 43.8   PLATELETCT 209 193 169   MPV 10.0 10.3 10.2    and   Recent Labs     06/03/20 2051 06/04/20 0101  06/05/20  0320   SODIUM 135 137 136   POTASSIUM 3.8 4.0 3.9   CHLORIDE 102 104 102   CO2 21 26 22   GLUCOSE 116* 90 104*   BUN 28* 29* 20   CREATININE 0.86 0.96 0.69   CALCIUM 9.3 9.3 8.9       Total time of the discharge process exceeds 40 minutes.      Ellyn Blanco M.D.

## 2020-06-05 NOTE — PROGRESS NOTES
Received Bedside report. Assumed care at 1915. This pt is AOx4,. Patient and RN discussed plan of care questions answered. Labs noted Tele rhythm and monitor verified. Bed in lowest lock postion, 2 side rails up.Call light in place, fall precautions in place, patient educated on importance of calling for assistance. No additional needs at this time. VSS

## 2020-06-05 NOTE — PROGRESS NOTES
Discharge instructions given and discussed, Signed copy in chart. Patient verbalizes understanding with all questions answered. One new prescription given to patient and to be fill by patient. Pt discharged to home via taxi voucher and bus to [facility] . In stable condition, on RA. Escorted by staff. Personal belongings returned to patient. IV removed and tolerated well. Telemetry box removed and monitor room notified.

## 2020-06-05 NOTE — DISCHARGE PLANNING
Anticipated Discharge Disposition: Home w/ transportation assistance    Action: Patient taking bus home to [place].  Talked with him and he has money to cover the bus, but not to get airport.  Taxi voucher provided for patient to the airport.  Given to KRISTY Blanchard RN.    Barriers to Discharge: n/a    Plan: Home no needs

## 2020-06-05 NOTE — DISCHARGE INSTRUCTIONS
Dr. Blanco's discharge instructions:    DIET: Resume home diet previous to admission    ACTIVITY: Resume previous level of activities.    DIAGNOSIS: Urinary tract infection, chest pain    Return to ER if fever greater than 38 degree Celsius or 100.4 degree Fahrenheit, worsening pain, chest pain at rest or associated with exertion, worsening shortness of breath, bloody coughs, bloody bowel movement, severe unrelenting abdominal pain, focal weakness.    Ellyn Blanco M.D.     Discharge Instructions    Discharged to home by taxi with self. Discharged via walking, hospital escort: Yes.  Special equipment needed: Not Applicable    Be sure to schedule a follow-up appointment with your primary care doctor or any specialists as instructed.     Discharge Plan:   Diet Plan: Discussed  Activity Level: Discussed  Confirmed Follow up Appointment: Appointment Scheduled  Confirmed Symptoms Management: Discussed  Medication Reconciliation Updated: Yes    I understand that a diet low in cholesterol, fat, and sodium is recommended for good health. Unless I have been given specific instructions below for another diet, I accept this instruction as my diet prescription.   Other diet: Cardiac    Special Instructions: Diagnosis:  Acute Coronary Syndrome (ACS) is a diagnosis that encompasses cardiac-related chest pain and heart attack. ACS occurs when the blood flow to the heart muscle is severely reduced or cut off completely due to a slow process called atherosclerosis.  Atherosclerosis is a disease in which the coronary arteries become narrow from a buildup of fat, cholesterol, and other substances that combine to form plaque. If the plaque breaks, a blood clot will form and block the blood flow to the heart muscle. This lack of blood flow can cause damage or death to the heart muscle which is called a heart attack or Myocardial Infarction (MI). There are two different types of MIs:  ST Elevation Myocardial Infarction or STEMI (the most  severe type of heart attack) and Non-ST Elevation Myocardial Infarction or NSTEMI.    Treatment Plan:  · Cardiac Diet  - Low fat, low salt, low cholesterol   · Cardiac Rehab  - Your doctor has ordered you a referral to [facility].  Call [phone] to schedule an appointment.  · Attend my follow-up appointment with my Cardiologist.  · Take my medications as prescribed by my doctor  · Exercise daily  · Lower my bad cholesterol and raise my good cholesterol and Reduce stress    Medications:  Certain medications are used to treat ACS.  Remember to always take medications as prescribed and never stop talking medications unless told by your doctor.    You have been prescribed the following medicatons:    Beta-Blocker - Beta-Blocker Coreg is used to lower blood pressure and heart rate, and/or helps your heart heal after a heart attack.  Statin - Statin  is used to lower cholesterol.    · Is patient discharged on Warfarin / Coumadin?   No     Depression / Suicide Risk    As you are discharged from this [facility] facility, it is important to learn how to keep safe from harming yourself.    Recognize the warning signs:  · Abrupt changes in personality, positive or negative- including increase in energy   · Giving away possessions  · Change in eating patterns- significant weight changes-  positive or negative  · Change in sleeping patterns- unable to sleep or sleeping all the time   · Unwillingness or inability to communicate  · Depression  · Unusual sadness, discouragement and loneliness  · Talk of wanting to die  · Neglect of personal appearance   · Rebelliousness- reckless behavior  · Withdrawal from people/activities they love  · Confusion- inability to concentrate     If you or a loved one observes any of these behaviors or has concerns about self-harm, here's what you can do:  · Talk about it- your feelings and reasons for harming yourself  · Remove any means that you might use to hurt yourself (examples: pills,  rope, extension cords, firearm)  · Get professional help from the community (Mental Health, Substance Abuse, psychological counseling)  · Do not be alone:Call your Safe Contact- someone whom you trust who will be there for you.  · Call your local CRISIS HOTLINE 582-1346 or 453-407-9608  · Call your local Children's Mobile Crisis Response Team [place] (320) 832-3509 or www.[employer]  · Call the toll free National Suicide Prevention Hotlines   · National Suicide Prevention Lifeline 705-959-UXEK (8681)  · National Hope Line Network 800-SUICIDE (047-4406)

## 2020-06-05 NOTE — PROGRESS NOTES
Community Health Worker Intake  • Social determinates of health intake complete.   • Identified barriers to none.  • Contact information provided to Christopher Quintero.  • Inpatient assessment completed.    CHW Mary spoke with pt via TC to introduce CCM services. Pt accepted. Pt reports PCP is at [facility] in [place], [place]. Pt reports no follow up appmnt yet. Pt declined for this CHW to assist with scheduling. Educated pt on the importance of follow ups. Pt reports hospital records will be sent over to his Cardiologist. Pt reports no trouble paying for resources such as care, housing, and food. Pt feels confident in managing his health after d/c. Pt reports no other needs at this time.     Plan: Follow up call after d/c.

## 2020-06-06 LAB
BACTERIA UR CULT: NORMAL
SIGNIFICANT IND 70042: NORMAL
SITE SITE: NORMAL
SOURCE SOURCE: NORMAL

## 2020-06-08 ENCOUNTER — PATIENT OUTREACH (OUTPATIENT)
Dept: HEALTH INFORMATION MANAGEMENT | Facility: OTHER | Age: 75
End: 2020-06-08

## 2020-06-08 NOTE — PROGRESS NOTES
Community Health Worker Intake  • Social determinates of health intake complete.   • Identified barriers to none.  • Contact information provided to Christopher Quintero.  • Outpatient assessment completed.      Incoming call from Christopher. He reports doing well after d/c. He reports he made it home to [place]. Reviewed and discussed AVS with Christopher. He reports no medications were needed for . No follow up appmnt with PCP at [facility] in [place], [place]. He reports he will follow up with his Cardiologist appointment instead. He reports he gave them a call, and is just waiting for a call back. Educated Christopher on the importance of follow up appmnts with PCP. He reports he will make an appmnt with PCP when he feels he needs to. Christopher declined to speak to [facility] RN for health education, and SW for additional resources. Christopher reports no other needs at this time. Advised him to reach out to me when needed. Christopher agreed. Christopher's needs have been met. He will be discharge from [facility] services.